# Patient Record
Sex: FEMALE | Race: WHITE | NOT HISPANIC OR LATINO | Employment: FULL TIME | URBAN - METROPOLITAN AREA
[De-identification: names, ages, dates, MRNs, and addresses within clinical notes are randomized per-mention and may not be internally consistent; named-entity substitution may affect disease eponyms.]

---

## 2024-02-23 PROBLEM — F41.9 ANXIETY: Status: ACTIVE | Noted: 2024-02-23

## 2024-02-23 PROBLEM — K50.00 CROHN'S DISEASE OF SMALL INTESTINE WITHOUT COMPLICATION: Status: ACTIVE | Noted: 2024-02-23

## 2024-02-23 PROBLEM — F32.1 CURRENT MODERATE EPISODE OF MAJOR DEPRESSIVE DISORDER: Status: ACTIVE | Noted: 2024-02-23

## 2024-02-23 PROBLEM — K90.0 CELIAC DISEASE: Status: ACTIVE | Noted: 2024-02-23

## 2024-02-23 PROBLEM — K21.9 CHRONIC GERD: Status: ACTIVE | Noted: 2024-02-23

## 2024-05-02 ENCOUNTER — APPOINTMENT (OUTPATIENT)
Dept: GENERAL RADIOLOGY | Facility: HOSPITAL | Age: 49
End: 2024-05-02
Payer: COMMERCIAL

## 2024-05-02 ENCOUNTER — HOSPITAL ENCOUNTER (EMERGENCY)
Facility: HOSPITAL | Age: 49
Discharge: HOME | End: 2024-05-02
Attending: EMERGENCY MEDICINE
Payer: COMMERCIAL

## 2024-05-02 VITALS
RESPIRATION RATE: 18 BRPM | BODY MASS INDEX: 31.6 KG/M2 | HEART RATE: 90 BPM | SYSTOLIC BLOOD PRESSURE: 118 MMHG | HEIGHT: 60 IN | OXYGEN SATURATION: 98 % | TEMPERATURE: 98.3 F | WEIGHT: 160.94 LBS | DIASTOLIC BLOOD PRESSURE: 62 MMHG

## 2024-05-02 DIAGNOSIS — S92.255A CLOSED NONDISPLACED FRACTURE OF NAVICULAR BONE OF LEFT FOOT, INITIAL ENCOUNTER: Primary | ICD-10-CM

## 2024-05-02 PROCEDURE — 73630 X-RAY EXAM OF FOOT: CPT

## 2024-05-02 PROCEDURE — 99283 EMERGENCY DEPT VISIT LOW MDM: CPT

## 2024-05-02 PROCEDURE — 73610 X-RAY EXAM OF ANKLE: CPT

## 2024-05-02 RX ORDER — OXYCODONE HYDROCHLORIDE AND ACETAMINOPHEN 5; 325 MG/1; MG/1
1 TABLET ORAL EVERY 6 HOURS PRN
Qty: 12 TABLET | Refills: 0 | Status: SHIPPED | OUTPATIENT
Start: 2024-05-02 | End: 2024-05-07 | Stop reason: SDUPTHER

## 2024-05-02 RX ORDER — TRAMADOL HYDROCHLORIDE 50 MG/1
50 TABLET ORAL ONCE
Status: DISCONTINUED | OUTPATIENT
Start: 2024-05-02 | End: 2024-05-02

## 2024-05-02 NOTE — Clinical Note
[facility] EMERGENCY ROOM  [address] [place] [address]  Phone: [phone]  Fax: [phone]    Geraldine Dobson was seen and treated in our emergency department on 5/2/2024.  She may return to work on 05/06/2024.         Thank you for choosing [facility].    Bhupinder Meyers MD

## 2024-05-02 NOTE — Clinical Note
[facility] EMERGENCY ROOM  [address] [place] [address]  Phone: [phone]  Fax: [phone]    Geraldine Dobson was seen and treated in our emergency department on 5/2/2024.  She may return to work on 05/05/2024.         Thank you for choosing [facility].    Bhupinder Meyers MD

## 2024-05-03 ENCOUNTER — TELEPHONE (OUTPATIENT)
Dept: ORTHOPEDIC SURGERY | Facility: CLINIC | Age: 49
End: 2024-05-03
Payer: COMMERCIAL

## 2024-05-03 NOTE — ED PROVIDER NOTES
"Time: 8:41 PM EDT  Date of encounter:  5/2/2024  Independent Historian/Clinical History and Information was obtained by:   Patient    History is limited by: N/A    Chief Complaint: Ankle injury      History of Present Illness:  Patient is a 48 y.o. year old female who presents to the emergency department for evaluation of left ankle and foot injury.  Patient was trying to run after her dog in the yard and did not realize there was a hole and stepped into it.  Her foot stayed in the hole in her ankle made her fall forward.  She felt like she heard a \"crunching sound\".  This happened at about 2 PM and patient has had pain since.  She is able to ambulate but it is painful.  No numbness tingling or weakness.  No previous injury.  Rates her pain currently 8 out of 10.    HPI    Patient Care Team  Primary Care Provider: Noah Razo MD    Past Medical History:     Allergies   Allergen Reactions    Other Anaphylaxis     Opioids.     Latex Hives    Morphine Nausea And Vomiting    Nsaids Unknown - High Severity     Past Medical History:   Diagnosis Date    Anxiety and depression     Celiac disease     Crohn's disease     GERD (gastroesophageal reflux disease)     IBS (irritable bowel syndrome)     PTSD (post-traumatic stress disorder)      Past Surgical History:   Procedure Laterality Date    APPENDECTOMY      CHOLECYSTECTOMY      HYSTERECTOMY      TRIGGER FINGER RELEASE Right      History reviewed. No pertinent family history.    Home Medications:  Prior to Admission medications    Medication Sig Start Date End Date Taking? Authorizing Provider   famotidine (PEPCID) 20 MG tablet Every 12 (Twelve) Hours.    ProviderPeña MD   omeprazole (priLOSEC) 20 MG capsule  2/13/24   ProviderPeña MD   PARoxetine (Paxil) 40 MG tablet Take 1 tablet by mouth Every Morning for 14 days. 1/30/24 2/13/24  Kelsea Harry MD        Social History:   Social History     Tobacco Use    Smoking status: Every Day     " "Current packs/day: 0.50     Types: Cigarettes     Passive exposure: Past    Smokeless tobacco: Never   Vaping Use    Vaping status: Never Used   Substance Use Topics    Alcohol use: Not Currently    Drug use: Never         Review of Systems:  Review of Systems   Musculoskeletal:  Positive for arthralgias (Left ankle and foot), gait problem and joint swelling (Left foot and ankle).   Skin:  Negative for color change and wound.   Neurological:  Negative for weakness and numbness.   Hematological: Negative.    Psychiatric/Behavioral: Negative.     All other systems reviewed and are negative.       Physical Exam:  /68 (BP Location: Right arm, Patient Position: Sitting)   Pulse 97   Temp 98.1 °F (36.7 °C) (Oral)   Resp 16   Ht 152.4 cm (60\")   Wt 73 kg (160 lb 15 oz)   SpO2 97%   BMI 31.43 kg/m²     Physical Exam  Vitals and nursing note reviewed.   Constitutional:       Appearance: Normal appearance.   HENT:      Head: Atraumatic.      Nose: Nose normal.      Mouth/Throat:      Mouth: Mucous membranes are moist.   Eyes:      Conjunctiva/sclera: Conjunctivae normal.   Cardiovascular:      Pulses: Normal pulses.   Pulmonary:      Effort: Pulmonary effort is normal.   Musculoskeletal:         General: Swelling (Proximal left foot and anterior and lateral left ankle) and tenderness present.      Cervical back: Normal range of motion.      Comments: Patient has full range of motion but has pain with flexion and extension as well as eversion   Skin:     General: Skin is warm and dry.      Capillary Refill: Capillary refill takes less than 2 seconds.   Neurological:      General: No focal deficit present.      Mental Status: She is alert.   Psychiatric:         Mood and Affect: Mood normal.         Behavior: Behavior normal.                Medical Decision Making:      Comorbidities that affect care:    Anxiety depression PTSD, smoking    External Notes reviewed:    Previous Clinic Note: Patient last seen by PCP " on April 4 for possible shingles      The following orders were placed and all results were independently analyzed by me:  Orders Placed This Encounter   Procedures    [facility] 08.  CAM Boot, 11.  Crutches; Yes; Yes; pain; Yes; Prevents Completion of MRADLs Within Reasonable Time Frame; Able to Safely Use Equipment; Mobility Deficit Can Be Sufficiently Resolved By Use of Equipment    XR Foot 3+ View Left    XR Ankle 3+ View Left    Ambulatory Referral to Orthopedic Surgery       Medications Given in the Emergency Department:  Medications - No data to display     ED Course:    ED Course as of 05/02/24 2136   Thu May 02, 2024   2120 XR Foot 3+ View Left  Nondisplaced navicular bone fracture [DS]   2120 XR Ankle 3+ View Left  No acute findings [DS]      ED Course User Index  [DS] Kirstin Morales APRN       Labs:    Lab Results (last 24 hours)       ** No results found for the last 24 hours. **             Imaging:    XR Foot 3+ View Left    Result Date: 5/2/2024  XR FOOT 3+ VW LEFT-  Date of Exam: 5/2/2024 9:06 PM  Indication: pain/ injury  Comparison: Left ankle series 5/2/2024  FINDINGS: There is a nondisplaced fracture of the proximal superior aspect of the navicular bone. There is adjacent soft tissue swelling.      Nondisplaced fracture of the proximal superior aspect of the navicular bone. Soft tissue swelling.   Electronically Signed By-ANDREW CASIANO MD On:5/2/2024 9:15 PM      XR Ankle 3+ View Left    Result Date: 5/2/2024  XR ANKLE 3+ VW LEFT-  Date of Exam: 5/2/2024 9:02 PM  Indication: injury  Comparison: None available.   FINDINGS: The ankle mortise is intact. No fractures, dislocations or acute bony abnormalities are identified. There are minimal calcaneal enthesophytes. Anterior soft tissue swelling is present.      Anterior soft tissue swelling. No acute osseous abnormalities are identified..                          Electronically Signed By-ANDREW CASIANO MD On:5/2/2024 9:13 PM          Differential Diagnosis and Discussion:    Extremity Pain: Differential diagnosis includes but is not limited to soft tissue sprain, tendonitis, tendon injury, dislocation, fracture, deep vein thrombosis, arterial insufficiency, osteoarthritis, bursitis, and ligamentous damage.    All X-rays impressions were independently interpreted by me.    MDM  Number of Diagnoses or Management Options  Closed nondisplaced fracture of navicular bone of left foot, initial encounter  Diagnosis management comments: The patient was placed in a walking boot splint and crutches in the emergency department. The patient was reassessed status post splinting. The patient in neurovascular intact with no numbness, tingling, or signs of compartment syndrome. The patient was counseled to follow up with the orthopedic surgeon as detailed in the discharge instructions. The patient was counseled on the signs and symptoms of compartment syndrome including worsening pain, swelling, sensory abnormalities, and color change. The patient was instructed to return to the ED sooner for re-evaluation of any of these symptoms.       Amount and/or Complexity of Data Reviewed  Tests in the radiology section of CPT®: reviewed and ordered  Tests in the medicine section of CPT®: ordered (Patient refused medication here but will take a prescription at home)    Risk of Complications, Morbidity, and/or Mortality  Presenting problems: low  Diagnostic procedures: low  Management options: low    Patient Progress  Patient progress: stable         Patient Care Considerations:    CONSULT: I considered consulting orthopedic surgery or podiatry, however x-ray is nondisplaced and patient can be placed in a boot with crutches and follow-up outpatient      Consultants/Shared Management Plan:    None    Social Determinants of Health:    Patient is independent, reliable, and has access to care.       Disposition and Care Coordination:    Discharged: The patient is  suitable and stable for discharge with no need for consideration of admission.    I have explained the patient´s condition, diagnoses and treatment plan based on the information available to me at this time. I have answered questions and addressed any concerns. The patient has a good  understanding of the patient´s diagnosis, condition, and treatment plan as can be expected at this point. The vital signs have been stable. The patient´s condition is stable and appropriate for discharge from the emergency department.      The patient will pursue further outpatient evaluation with the primary care physician or other designated or consulting physician as outlined in the discharge instructions. They are agreeable to this plan of care and follow-up instructions have been explained in detail. The patient has received these instructions in written format and has expressed an understanding of the discharge instructions. The patient is aware that any significant change in condition or worsening of symptoms should prompt an immediate return to this or the closest emergency department or call to 911.  I have explained discharge medications and the need for follow up with the patient/caretakers. This was also printed in the discharge instructions. Patient was discharged with the following medications and follow up:      Medication List        New Prescriptions      oxyCODONE-acetaminophen 5-325 MG per tablet  Commonly known as: PERCOCET  Take 1 tablet by mouth Every 6 (Six) Hours As Needed for Severe Pain.               Where to Get Your Medications        These medications were sent to [facility]/pharmacy #[id number] - [place] - [address] - [phone]  - [phone] FX  [address] [place] [address]      Hours: 24-hours Phone: [phone]   oxyCODONE-acetaminophen 5-325 MG per tablet      [facility]  [address] [facility] [address]  [phone]           Final  diagnoses:   Closed nondisplaced fracture of navicular bone of left foot, initial encounter        ED Disposition       ED Disposition   Discharge    Condition   Stable    Comment   --               This medical record created using voice recognition software.             Kirstin Morales, APRN  05/02/24 9646

## 2024-05-07 ENCOUNTER — OFFICE VISIT (OUTPATIENT)
Dept: ORTHOPEDIC SURGERY | Facility: CLINIC | Age: 49
End: 2024-05-07
Payer: COMMERCIAL

## 2024-05-07 VITALS
OXYGEN SATURATION: 95 % | BODY MASS INDEX: 31.41 KG/M2 | HEART RATE: 94 BPM | HEIGHT: 60 IN | SYSTOLIC BLOOD PRESSURE: 123 MMHG | WEIGHT: 160 LBS | DIASTOLIC BLOOD PRESSURE: 84 MMHG

## 2024-05-07 DIAGNOSIS — S92.255A CLOSED NONDISPLACED FRACTURE OF NAVICULAR BONE OF LEFT FOOT, INITIAL ENCOUNTER: ICD-10-CM

## 2024-05-07 DIAGNOSIS — M79.672 LEFT FOOT PAIN: Primary | ICD-10-CM

## 2024-05-07 DIAGNOSIS — M25.572 LEFT ANKLE PAIN, UNSPECIFIED CHRONICITY: ICD-10-CM

## 2024-05-07 PROCEDURE — 99203 OFFICE O/P NEW LOW 30 MIN: CPT | Performed by: ORTHOPAEDIC SURGERY

## 2024-05-07 PROCEDURE — 28450 TX TARSAL B1 FX W/O MNPJ EA: CPT | Performed by: ORTHOPAEDIC SURGERY

## 2024-05-07 RX ORDER — DICYCLOMINE HYDROCHLORIDE 10 MG/1
CAPSULE ORAL
COMMUNITY
Start: 2024-04-04

## 2024-05-07 RX ORDER — OMEPRAZOLE 20 MG/1
CAPSULE, DELAYED RELEASE ORAL
COMMUNITY

## 2024-05-07 RX ORDER — OXYCODONE HYDROCHLORIDE AND ACETAMINOPHEN 5; 325 MG/1; MG/1
1 TABLET ORAL EVERY 6 HOURS PRN
Qty: 12 TABLET | Refills: 0 | Status: SHIPPED | OUTPATIENT
Start: 2024-05-07

## 2024-05-07 RX ORDER — CLONIDINE HYDROCHLORIDE 0.1 MG/1
TABLET ORAL
COMMUNITY
Start: 2024-04-04

## 2024-05-07 RX ORDER — FAMOTIDINE 40 MG/1
TABLET, FILM COATED ORAL
COMMUNITY

## 2024-05-07 RX ORDER — PAROXETINE HYDROCHLORIDE 40 MG/1
1 TABLET, FILM COATED ORAL DAILY
COMMUNITY

## 2024-05-08 ENCOUNTER — PATIENT ROUNDING (BHMG ONLY) (OUTPATIENT)
Dept: ORTHOPEDIC SURGERY | Facility: CLINIC | Age: 49
End: 2024-05-08
Payer: COMMERCIAL

## 2024-05-14 ENCOUNTER — TELEPHONE (OUTPATIENT)
Dept: ORTHOPEDIC SURGERY | Facility: CLINIC | Age: 49
End: 2024-05-14
Payer: COMMERCIAL

## 2024-05-14 NOTE — TELEPHONE ENCOUNTER
PATIENT STATES HER SWELLING HAS GONE DOWN A LOT AND HER CAST IS VERY LOOSE. SHE STATES SHE CAN REACH HER HAND PRETTY FAR DOWN INTO THE CAST. SPOKE WITH AI BARRIOS WITH DR. VILLAGOMEZ, PER SOPHIE PATIENT CAN COME IN TOMORROW MORNING AT THE [facility] OFFICE TO SEE HER FOR CAST CHANGE.   PATIENT AWARE AND STATES SHE WILL COME FIRST THING.

## 2024-06-10 ENCOUNTER — OFFICE VISIT (OUTPATIENT)
Dept: ORTHOPEDIC SURGERY | Facility: CLINIC | Age: 49
End: 2024-06-10
Payer: COMMERCIAL

## 2024-06-10 VITALS — BODY MASS INDEX: 31.41 KG/M2 | HEIGHT: 60 IN | WEIGHT: 160 LBS

## 2024-06-10 DIAGNOSIS — S92.255D CLOSED NONDISPLACED FRACTURE OF NAVICULAR BONE OF LEFT FOOT WITH ROUTINE HEALING, SUBSEQUENT ENCOUNTER: ICD-10-CM

## 2024-06-10 DIAGNOSIS — M79.672 LEFT FOOT PAIN: Primary | ICD-10-CM

## 2024-06-10 DIAGNOSIS — M25.572 LEFT ANKLE PAIN, UNSPECIFIED CHRONICITY: ICD-10-CM

## 2024-06-10 PROBLEM — S92.255A CLOSED NONDISPLACED FRACTURE OF NAVICULAR BONE OF LEFT FOOT: Status: ACTIVE | Noted: 2024-06-10

## 2024-06-10 PROCEDURE — 1159F MED LIST DOCD IN RCRD: CPT | Performed by: PHYSICIAN ASSISTANT

## 2024-06-10 PROCEDURE — 1160F RVW MEDS BY RX/DR IN RCRD: CPT | Performed by: PHYSICIAN ASSISTANT

## 2024-06-10 PROCEDURE — 99213 OFFICE O/P EST LOW 20 MIN: CPT | Performed by: PHYSICIAN ASSISTANT

## 2024-06-10 NOTE — PROGRESS NOTES
"Chief Complaint  Follow-up of the Left Foot    Subjective          History of Present Illness      Geraldine Dobson is a 49 y.o. female  presents to [facility] for     Patient presents for follow-up evaluation of left navicular fracture, original injury was 5/7/2024.  She saw Dr. Jimenez he placed her into a cast cast was removed today for x-rays and physical exam after 4 weeks of casting.  Patient denies pain she states she has good range of motion already even out of the cast she denies need for pain medication or NSAIDs she states she has been walking with the cast shoe, bearing weight without pain or difficulty.  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      Allergies   Allergen Reactions    Other Anaphylaxis     Opioids.     Fentanyl Unknown - Low Severity    Latex Hives    Morphine Nausea And Vomiting    Nsaids Unknown - High Severity    Tramadol Unknown - Low Severity        Social History     Socioeconomic History    Marital status:    Tobacco Use    Smoking status: Every Day     Current packs/day: 0.50     Types: Cigarettes     Passive exposure: Past    Smokeless tobacco: Never   Vaping Use    Vaping status: Never Used   Substance and Sexual Activity    Alcohol use: Not Currently    Drug use: Never    Sexual activity: Defer        REVIEW OF SYSTEMS    Constitutional: Awake alert and oriented x3, no acute distress, denies fevers, chills, weight loss  Respiratory: No respiratory distress  Vascular: Brisk cap refill, Intact distal pulses, No cyanosis, compartments soft with no signs or symptoms of compartment syndrome or DVT.   Cardiovascular: Denies chest pain, shortness of breath  Skin: Denies rashes, acute skin changes  Neurologic: Denies headache, loss of consciousness  MSK: Left foot pain      Objective   Vital Signs:   Ht 152.4 cm (60\")   Wt 72.6 kg (160 lb)   BMI 31.25 kg/m²   "   Body mass index is 31.25 kg/m².    Physical Exam       Left foot: Skin is intact, mild dryness, no skin irritation or full-thickness skin loss, patient able to wiggle toes, sensation intact to light touch, 2+ dorsalis pedis/posterior tibialis pulses, capillary refill less than 3 seconds, nontender calf, negative Itzel testing      Procedures    Imaging Results (Most Recent)       Procedure Component Value Units Date/Time    XR Foot 2 View Left [[id number]] Resulted: 06/10/24 1516     Updated: 06/10/24 1517    Narrative:      View:AP/Lateral view(s)  Site: Left foot  Indication: Left foot pain  Study: X-rays ordered, taken in the office, and reviewed today  X-ray: Good healing of navicular fracture fracture alignment remains   stable compared to previous studies  Comparative data: Previous studies             Result Review :   The following data was reviewed by: RAYMOND Jimenez on 06/10/2024:  Data reviewed : Radiologic studies reviewed by me with the patient              Assessment and Plan    Diagnoses and all orders for this visit:    1. Left foot pain (Primary)  -     XR Foot 2 View Left    2. Left ankle pain, unspecified chronicity    3. Closed nondisplaced fracture of navicular bone of left foot with routine healing, subsequent encounter        Reviewed x-rays with the patient discussed diagnosis and treatment options with her she was advised that she can remain out of the cast she was placed into a short boot, weight-bear as tolerated in the boot she would like to return to work 8-hour shifts she was given a note for this, if any new or concerning symptoms occur call right away otherwise she was advised to do home exercises and follow-up in 4 weeks for recheck with x-rays.    Call or return if worsening symptoms.    Follow Up   Return in about 4 weeks (around 7/8/2024) for Recheck.  Patient was given instructions and counseling regarding her condition or for health maintenance advice. Please see  specific information pulled into the AVS if appropriate.       EMR Dragon/Transcription disclaimer:  Part of this note may be an electronic transcription/translation of spoken language to printed text using the Dragon Dictation System

## 2024-06-26 ENCOUNTER — TELEPHONE (OUTPATIENT)
Dept: ORTHOPEDIC SURGERY | Facility: CLINIC | Age: 49
End: 2024-06-26
Payer: COMMERCIAL

## 2024-06-26 NOTE — TELEPHONE ENCOUNTER
Caller: Geraldine Dobson     Relationship: SELF     Best call back number: [phone]    What is your medical concern? PAIN, AND SWOLLEN - LEFT ANKLE . SHARP PAIN ON BOTH SIDE OF LEFT ANKLE.PT STATES THAT THERE IS PAIN IN THE LEFT HEEL -  PT STATES THAT SHE MAY HAVE RE-INJURED HER ANKLE AND WHEN SHE STEPS DOWN IT FEELS LIKE HER BONE IS COMING OUT OF HER HEEL.     PT STATES THAT SHE BROKE HER ANKLE AND IS CONCERNED ABOUT THIS NEW PAIN.    PLEASE CONTACT PT AND ADVISE    TRIED TO WT- NO ANSWER    How long has this issue been going on? YESTERDAY      Have you been treated for this issue? LAST VISIT FOR HER ANKLE WAS 6/10/24

## 2024-07-15 ENCOUNTER — OFFICE VISIT (OUTPATIENT)
Dept: ORTHOPEDIC SURGERY | Facility: CLINIC | Age: 49
End: 2024-07-15
Payer: COMMERCIAL

## 2024-07-15 VITALS
DIASTOLIC BLOOD PRESSURE: 74 MMHG | OXYGEN SATURATION: 96 % | WEIGHT: 160 LBS | HEART RATE: 87 BPM | SYSTOLIC BLOOD PRESSURE: 135 MMHG | BODY MASS INDEX: 31.41 KG/M2 | HEIGHT: 60 IN

## 2024-07-15 DIAGNOSIS — S92.255D CLOSED NONDISPLACED FRACTURE OF NAVICULAR BONE OF LEFT FOOT WITH ROUTINE HEALING, SUBSEQUENT ENCOUNTER: ICD-10-CM

## 2024-07-15 DIAGNOSIS — M25.572 LEFT ANKLE PAIN, UNSPECIFIED CHRONICITY: ICD-10-CM

## 2024-07-15 DIAGNOSIS — M79.672 LEFT FOOT PAIN: Primary | ICD-10-CM

## 2024-07-15 PROCEDURE — 1159F MED LIST DOCD IN RCRD: CPT | Performed by: PHYSICIAN ASSISTANT

## 2024-07-15 PROCEDURE — 99024 POSTOP FOLLOW-UP VISIT: CPT | Performed by: PHYSICIAN ASSISTANT

## 2024-07-15 PROCEDURE — 1160F RVW MEDS BY RX/DR IN RCRD: CPT | Performed by: PHYSICIAN ASSISTANT

## 2024-07-15 NOTE — PROGRESS NOTES
Chief Complaint  Follow-up of the Left Foot    Subjective          History of Present Illness      Geraldine Dobson is a 49 y.o. female  presents to [facility] for     Patient presents for follow-up evaluation of left navicular fracture, original injury was 5/7/2024.  Patient was in a cast cast was removed at last visit she was placed into a short boot.  Patient states she has been ambulating well in the short boot she denies pain, denies swelling, denies difficulty with activities.  She has been back to work as light duty with 8-hour shifts instead of 12-hour shifts.  She states she tolerated those well.  She states she has no pain she has weightbearing at her home without any difficulty or pain or trouble.  No new complaints today.  She denies need for pain medication or NSAIDs.      Allergies   Allergen Reactions    Other Anaphylaxis     Opioids.     Fentanyl Unknown - Low Severity    Latex Hives    Morphine Nausea And Vomiting    Nsaids Unknown - High Severity    Tramadol Unknown - Low Severity        Social History     Socioeconomic History    Marital status:    Tobacco Use    Smoking status: Every Day     Current packs/day: 0.50     Types: Cigarettes     Passive exposure: Past    Smokeless tobacco: Never   Vaping Use    Vaping status: Never Used   Substance and Sexual Activity    Alcohol use: Not Currently    Drug use: Never    Sexual activity: Defer        REVIEW OF SYSTEMS    Constitutional: Awake alert and oriented x3, no acute distress, denies fevers, chills, weight loss  Respiratory: No respiratory distress  Vascular: Brisk cap refill, Intact distal pulses, No cyanosis, compartments soft with no signs or symptoms of compartment syndrome or DVT.   Cardiovascular: Denies chest pain, shortness of breath  Skin: Denies rashes, acute skin changes  Neurologic: Denies headache, loss of consciousness  MSK: Left ankle and foot pain      Objective   Vital Signs:   /74    "Pulse 87   Ht 152.4 cm (60\")   Wt 72.6 kg (160 lb)   SpO2 96%   BMI 31.25 kg/m²     Body mass index is 31.25 kg/m².    Physical Exam       Left ankle/foot: Nontender to palpation in area of fracture site, skin is intact, no erythema ecchymosis swelling or signs of infection, patient able to wiggle toes dorsiflexion 10 plantarflexion 40, stable, no pain with resisted range of motion 5 out of 5 strength, patient ambulates with nonantalgic gait      Procedures    Imaging Results (Most Recent)       Procedure Component Value Units Date/Time    XR Foot 2 View Left [[id number]] Resulted: 07/15/24 1348     Updated: 07/15/24 1348    Narrative:      View:AP/Lateral view(s)  Site: Left foot  Indication: Left foot pain  Study: X-rays ordered, taken in the office, and reviewed today  X-ray: Well-healed navicular fracture fracture alignment remains stable   compared to previous studies, no increased displacement or angulation  Comparative data: Previous studies             Result Review :   The following data was reviewed by: RAYMOND Jimenez on 07/15/2024:  Data reviewed : Radiologic studies reviewed by me with the patient              Assessment and Plan    Diagnoses and all orders for this visit:    1. Left foot pain (Primary)    2. Left ankle pain, unspecified chronicity    3. Closed nondisplaced fracture of navicular bone of left foot with routine healing, subsequent encounter  -     XR Foot 2 View Left        Reviewed x-rays with the patient discussed diagnosis and treatment options with her she was advised to continue activity and weightbearing as tolerated she was given return to work full duty no restrictions follow-up as needed.  Patient agreed    Call or return if worsening symptoms.    Follow Up   Return if symptoms worsen or fail to improve.  Patient was given instructions and counseling regarding her condition or for health maintenance advice. Please see specific information pulled into the AVS if " appropriate.       EMR Dragon/Transcription disclaimer:  Part of this note may be an electronic transcription/translation of spoken language to printed text using the Dragon Dictation System

## 2024-08-30 ENCOUNTER — TELEPHONE (OUTPATIENT)
Dept: ORTHOPEDIC SURGERY | Facility: CLINIC | Age: 49
End: 2024-08-30
Payer: COMMERCIAL

## 2024-08-30 NOTE — TELEPHONE ENCOUNTER
ATTEMPTED TO WARM TRANSFER    Caller: DAVID MINAYA    Relationship to patient: SELF    Best call back number: [phone]    Chief complaint:  PATIENT THINKS SHE BROKE HER LEFT FOOT/ANKLE AGAIN LAST NIGHT. STEPPED WRONG IN A PARKING LOT. SAYS IS SWOLLEN, PAINFUL AND SHE CAN HARDLY BEAR WEIGHT. PATIENT HAS NOT GONE ANYWHERE FOR TREATMENT. NO APPT.'S AVAILABLE. PLEASE ADVISE.    Type of visit: NEW PROBLEM

## 2025-03-19 ENCOUNTER — HOSPITAL ENCOUNTER (EMERGENCY)
Facility: HOSPITAL | Age: 50
Discharge: HOME | End: 2025-03-19
Attending: EMERGENCY MEDICINE
Payer: COMMERCIAL

## 2025-03-19 VITALS
TEMPERATURE: 98.2 F | SYSTOLIC BLOOD PRESSURE: 127 MMHG | DIASTOLIC BLOOD PRESSURE: 69 MMHG | HEIGHT: 60 IN | RESPIRATION RATE: 15 BRPM | WEIGHT: 173.28 LBS | HEART RATE: 70 BPM | BODY MASS INDEX: 34.02 KG/M2 | OXYGEN SATURATION: 98 %

## 2025-03-19 DIAGNOSIS — F41.1 ANXIETY IN ACUTE STRESS REACTION: Primary | ICD-10-CM

## 2025-03-19 DIAGNOSIS — F43.0 ANXIETY IN ACUTE STRESS REACTION: Primary | ICD-10-CM

## 2025-03-19 DIAGNOSIS — R11.2 NAUSEA AND VOMITING, UNSPECIFIED VOMITING TYPE: ICD-10-CM

## 2025-03-19 DIAGNOSIS — R10.13 ABDOMINAL PAIN, ACUTE, EPIGASTRIC: ICD-10-CM

## 2025-03-19 LAB
ALBUMIN SERPL-MCNC: 3.9 G/DL (ref 3.5–5.2)
ALBUMIN/GLOB SERPL: 1.3 G/DL
ALP SERPL-CCNC: 104 U/L (ref 39–117)
ALT SERPL W P-5'-P-CCNC: 13 U/L (ref 1–33)
ANION GAP SERPL CALCULATED.3IONS-SCNC: 8.5 MMOL/L (ref 5–15)
AST SERPL-CCNC: 15 U/L (ref 1–32)
BASOPHILS # BLD AUTO: 0.09 10*3/MM3 (ref 0–0.2)
BASOPHILS NFR BLD AUTO: 0.8 % (ref 0–1.5)
BILIRUB SERPL-MCNC: <0.2 MG/DL (ref 0–1.2)
BUN SERPL-MCNC: 7 MG/DL (ref 6–20)
BUN/CREAT SERPL: 7.6 (ref 7–25)
CALCIUM SPEC-SCNC: 9.2 MG/DL (ref 8.6–10.5)
CHLORIDE SERPL-SCNC: 107 MMOL/L (ref 98–107)
CO2 SERPL-SCNC: 24.5 MMOL/L (ref 22–29)
CREAT SERPL-MCNC: 0.92 MG/DL (ref 0.57–1)
DEPRECATED RDW RBC AUTO: 43 FL (ref 37–54)
EGFRCR SERPLBLD CKD-EPI 2021: 77 ML/MIN/1.73
EOSINOPHIL # BLD AUTO: 0.18 10*3/MM3 (ref 0–0.4)
EOSINOPHIL NFR BLD AUTO: 1.5 % (ref 0.3–6.2)
ERYTHROCYTE [DISTWIDTH] IN BLOOD BY AUTOMATED COUNT: 12.9 % (ref 12.3–15.4)
GLOBULIN UR ELPH-MCNC: 3 GM/DL
GLUCOSE SERPL-MCNC: 103 MG/DL (ref 65–99)
HCT VFR BLD AUTO: 44.4 % (ref 34–46.6)
HGB BLD-MCNC: 14.8 G/DL (ref 12–15.9)
HOLD SPECIMEN: NORMAL
IMM GRANULOCYTES # BLD AUTO: 0.03 10*3/MM3 (ref 0–0.05)
IMM GRANULOCYTES NFR BLD AUTO: 0.3 % (ref 0–0.5)
LIPASE SERPL-CCNC: 22 U/L (ref 13–60)
LYMPHOCYTES # BLD AUTO: 1.85 10*3/MM3 (ref 0.7–3.1)
LYMPHOCYTES NFR BLD AUTO: 15.7 % (ref 19.6–45.3)
MCH RBC QN AUTO: 30 PG (ref 26.6–33)
MCHC RBC AUTO-ENTMCNC: 33.3 G/DL (ref 31.5–35.7)
MCV RBC AUTO: 90.1 FL (ref 79–97)
MONOCYTES # BLD AUTO: 0.8 10*3/MM3 (ref 0.1–0.9)
MONOCYTES NFR BLD AUTO: 6.8 % (ref 5–12)
NEUTROPHILS NFR BLD AUTO: 74.9 % (ref 42.7–76)
NEUTROPHILS NFR BLD AUTO: 8.82 10*3/MM3 (ref 1.7–7)
NRBC BLD AUTO-RTO: 0 /100 WBC (ref 0–0.2)
PLATELET # BLD AUTO: 325 10*3/MM3 (ref 140–450)
PMV BLD AUTO: 9.6 FL (ref 6–12)
POTASSIUM SERPL-SCNC: 4.2 MMOL/L (ref 3.5–5.2)
PROT SERPL-MCNC: 6.9 G/DL (ref 6–8.5)
RBC # BLD AUTO: 4.93 10*6/MM3 (ref 3.77–5.28)
SODIUM SERPL-SCNC: 140 MMOL/L (ref 136–145)
WBC NRBC COR # BLD AUTO: 11.77 10*3/MM3 (ref 3.4–10.8)
WHOLE BLOOD HOLD COAG: NORMAL

## 2025-03-19 PROCEDURE — 25810000003 SODIUM CHLORIDE 0.9 % SOLUTION: Performed by: EMERGENCY MEDICINE

## 2025-03-19 PROCEDURE — 80053 COMPREHEN METABOLIC PANEL: CPT | Performed by: EMERGENCY MEDICINE

## 2025-03-19 PROCEDURE — 96374 THER/PROPH/DIAG INJ IV PUSH: CPT

## 2025-03-19 PROCEDURE — 25010000002 LORAZEPAM PER 2 MG: Performed by: EMERGENCY MEDICINE

## 2025-03-19 PROCEDURE — 83690 ASSAY OF LIPASE: CPT | Performed by: EMERGENCY MEDICINE

## 2025-03-19 PROCEDURE — 93005 ELECTROCARDIOGRAM TRACING: CPT | Performed by: EMERGENCY MEDICINE

## 2025-03-19 PROCEDURE — 99283 EMERGENCY DEPT VISIT LOW MDM: CPT

## 2025-03-19 PROCEDURE — 36415 COLL VENOUS BLD VENIPUNCTURE: CPT | Performed by: EMERGENCY MEDICINE

## 2025-03-19 PROCEDURE — 96375 TX/PRO/DX INJ NEW DRUG ADDON: CPT

## 2025-03-19 PROCEDURE — 85025 COMPLETE CBC W/AUTO DIFF WBC: CPT | Performed by: EMERGENCY MEDICINE

## 2025-03-19 PROCEDURE — 93010 ELECTROCARDIOGRAM REPORT: CPT | Performed by: SPECIALIST

## 2025-03-19 PROCEDURE — 25010000002 METOCLOPRAMIDE PER 10 MG: Performed by: EMERGENCY MEDICINE

## 2025-03-19 RX ORDER — LORAZEPAM 2 MG/ML
1 INJECTION INTRAMUSCULAR ONCE
Status: COMPLETED | OUTPATIENT
Start: 2025-03-19 | End: 2025-03-19

## 2025-03-19 RX ORDER — FAMOTIDINE 10 MG/ML
20 INJECTION, SOLUTION INTRAVENOUS ONCE
Status: COMPLETED | OUTPATIENT
Start: 2025-03-19 | End: 2025-03-19

## 2025-03-19 RX ORDER — PROMETHAZINE HYDROCHLORIDE 25 MG/1
25 TABLET ORAL EVERY 6 HOURS PRN
Qty: 15 TABLET | Refills: 0 | Status: SHIPPED | OUTPATIENT
Start: 2025-03-19

## 2025-03-19 RX ORDER — LORAZEPAM 1 MG/1
1 TABLET ORAL EVERY 8 HOURS PRN
Qty: 10 TABLET | Refills: 0 | Status: SHIPPED | OUTPATIENT
Start: 2025-03-19

## 2025-03-19 RX ORDER — METOCLOPRAMIDE HYDROCHLORIDE 5 MG/ML
10 INJECTION INTRAMUSCULAR; INTRAVENOUS ONCE
Status: COMPLETED | OUTPATIENT
Start: 2025-03-19 | End: 2025-03-19

## 2025-03-19 RX ORDER — SODIUM CHLORIDE 0.9 % (FLUSH) 0.9 %
10 SYRINGE (ML) INJECTION AS NEEDED
Status: DISCONTINUED | OUTPATIENT
Start: 2025-03-19 | End: 2025-03-19 | Stop reason: HOSPADM

## 2025-03-19 RX ORDER — PAROXETINE 40 MG/1
40 TABLET, FILM COATED ORAL DAILY
Qty: 30 TABLET | Refills: 1 | Status: SHIPPED | OUTPATIENT
Start: 2025-03-19

## 2025-03-19 RX ADMIN — FAMOTIDINE 20 MG: 10 INJECTION INTRAVENOUS at 14:25

## 2025-03-19 RX ADMIN — METOCLOPRAMIDE 10 MG: 5 INJECTION, SOLUTION INTRAMUSCULAR; INTRAVENOUS at 14:24

## 2025-03-19 RX ADMIN — SODIUM CHLORIDE 1000 ML: 0.9 INJECTION, SOLUTION INTRAVENOUS at 14:24

## 2025-03-19 RX ADMIN — LORAZEPAM 1 MG: 2 INJECTION INTRAMUSCULAR; INTRAVENOUS at 14:25

## 2025-03-19 NOTE — DISCHARGE INSTRUCTIONS
Your lab work looked okay today and probably a lot of your symptoms were due to stress levels and anxiety, causing your vomiting and now you are having some acid reflux in your stomach.    Take anxiety medicine as needed and alternate with Phenergan for nausea and vomiting.    You can restart your Paxil.

## 2025-03-19 NOTE — ED PROVIDER NOTES
Time: 2:11 PM EDT  Date of encounter:  3/19/2025  Independent Historian/Clinical History and Information was obtained by:   Patient and Nursing Staff    History is limited by: N/A    Chief Complaint: Anxiety, nausea and vomiting, upper abdominal pain      History of Present Illness:  Patient is a 49 y.o. year old female with history of GERD and Crohn's disease and celiac disease, anxiety and PTSD who presents to the emergency department for evaluation of acute nausea and vomiting and upper abdominal pain, but also severe anxiety and panic attack earlier in the setting of her father recently passed away and she witnessed him and tried performing bystander CPR on him the other day.    It sounds like she has had several family members die that were close to her in the past couple years.    She has been vomiting frequently and not coping well with the high stress levels from this loss of her loved one.    Denies any known sick contacts or new medications that could be causing all this vomiting..      Patient Care Team  Primary Care Provider: Noah Razo MD    Past Medical History:     Allergies   Allergen Reactions    Other Anaphylaxis     Opioids.     Fentanyl Unknown - Low Severity    Latex Hives    Morphine Nausea And Vomiting    Nsaids Unknown - High Severity    Tramadol Unknown - Low Severity     Past Medical History:   Diagnosis Date    Anxiety and depression     Celiac disease     Crohn's disease     GERD (gastroesophageal reflux disease)     IBS (irritable bowel syndrome)     PTSD (post-traumatic stress disorder)      Past Surgical History:   Procedure Laterality Date    APPENDECTOMY      CHOLECYSTECTOMY      HYSTERECTOMY      TRIGGER FINGER RELEASE Right      History reviewed. No pertinent family history.    Home Medications:  Prior to Admission medications    Medication Sig Start Date End Date Taking? Authorizing Provider   azithromycin (Zithromax Z-David) 250 MG tablet Take 2 tablets by mouth on day 1,  "then 1 tablet daily on days 2-5 10/23/24   Erick Lorenzo,    dicyclomine (BENTYL) 10 MG capsule  4/4/24   Peña Suazo MD   famotidine (Pepcid) 40 MG tablet     Peña Suazo MD   omeprazole (priLOSEC) 20 MG capsule     Peña Suazo MD   PARoxetine (Paxil) 40 MG tablet Take 1 tablet by mouth Daily.    Peña Suazo MD   predniSONE (DELTASONE) 20 MG tablet Take 1 tablet twice a day with meals. 10/18/24   Alexsandra Stearns MD   promethazine-dextromethorphan (PROMETHAZINE-DM) 6.25-15 MG/5ML syrup Take 1 teaspoon each 4 to 6 hours as needed for cough.  Be aware that the medication can make you drowsy. 10/18/24   Alexsandra Stearns MD        Social History:   Social History     Tobacco Use    Smoking status: Former     Current packs/day: 0.50     Types: Cigarettes     Passive exposure: Past    Smokeless tobacco: Former   Vaping Use    Vaping status: Never Used   Substance Use Topics    Alcohol use: Not Currently    Drug use: Never         Review of Systems:  Review of Systems   I performed a 10 point review of systems which was all negative, except for the positives found in the HPI above.  Physical Exam:  /81   Pulse 73   Temp 98.2 °F (36.8 °C) (Oral)   Resp 20   Ht 152.4 cm (60\")   Wt 78.6 kg (173 lb 4.5 oz)   SpO2 92%   BMI 33.84 kg/m²     Physical Exam   General: Awake alert and in moderate distress, also appears anxious    HEENT: Head normocephalic atraumatic, eyes PERRLA EOMI, nose normal, oropharynx normal.  Mucous membranes mildly dry    Neck: Supple full range of motion, no meningismus, no lymphadenopathy    Heart: Regular rate and rhythm, no murmurs or rubs, 2+ radial pulses bilaterally    Lungs: Clear to auscultation bilaterally without wheezes or crackles, no respiratory distress    Abdomen: Soft, mildly tender in the epigastrium, nondistended, no rebound or guarding    Skin: Warm, dry, no rash    Musculoskeletal: Normal range of motion, no lower " extremity edema    Neurologic: Oriented x3, no motor deficits no sensory deficits    Psychiatric: Mood appears significantly anxious, no psychosis            Medical Decision Making:      Comorbidities that affect care:    GERD, celiac disease, anxiety and PTSD    External Notes reviewed:    None      The following orders were placed and all results were independently analyzed by me:  Orders Placed This Encounter   Procedures    Comprehensive Metabolic Panel    Lipase    CBC Auto Differential    ECG 12 Lead Tachycardia    Insert Peripheral IV    CBC & Differential    Extra Tubes    Gold Top - SST    Light Blue Top       Medications Given in the Emergency Department:  Medications   sodium chloride 0.9 % flush 10 mL (has no administration in time range)   sodium chloride 0.9 % bolus 1,000 mL (1,000 mL Intravenous New Bag 3/19/25 1424)   famotidine (PEPCID) injection 20 mg (20 mg Intravenous Given 3/19/25 1425)   metoclopramide (REGLAN) injection 10 mg (10 mg Intravenous Given 3/19/25 1424)   LORazepam (ATIVAN) injection 1 mg (1 mg Intravenous Given 3/19/25 1425)        ED Course:    ED Course as of 03/19/25 1529   Wed Mar 19, 2025   1434 EKG: I interpreted her twelve-lead EKG as normal sinus rhythm at 73 beats a minute, normal P waves, normal QRS, normal ST segments some T wave inversions in the septal leads only.  No acute ischemia or ectopy noted. [VS]      ED Course User Index  [VS] Alber Freeman MD       Labs:    Lab Results (last 24 hours)       Procedure Component Value Units Date/Time    CBC & Differential [[id number]]  (Abnormal) Collected: 03/19/25 1433    Specimen: Blood Updated: 03/19/25 1437    Narrative:      The following orders were created for panel order CBC & Differential.  Procedure                               Abnormality         Status                     ---------                               -----------         ------                     CBC Auto Differential[[id number]]        Abnormal             Final result                 Please view results for these tests on the individual orders.    CBC Auto Differential [[id number]]  (Abnormal) Collected: 03/19/25 1433    Specimen: Blood Updated: 03/19/25 1437     WBC 11.77 10*3/mm3      RBC 4.93 10*6/mm3      Hemoglobin 14.8 g/dL      Hematocrit 44.4 %      MCV 90.1 fL      MCH 30.0 pg      MCHC 33.3 g/dL      RDW 12.9 %      RDW-SD 43.0 fl      MPV 9.6 fL      Platelets 325 10*3/mm3      Neutrophil % 74.9 %      Lymphocyte % 15.7 %      Monocyte % 6.8 %      Eosinophil % 1.5 %      Basophil % 0.8 %      Immature Grans % 0.3 %      Neutrophils, Absolute 8.82 10*3/mm3      Lymphocytes, Absolute 1.85 10*3/mm3      Monocytes, Absolute 0.80 10*3/mm3      Eosinophils, Absolute 0.18 10*3/mm3      Basophils, Absolute 0.09 10*3/mm3      Immature Grans, Absolute 0.03 10*3/mm3      nRBC 0.0 /100 WBC     Comprehensive Metabolic Panel [[id number]]  (Abnormal) Collected: 03/19/25 1457    Specimen: Blood from Arm, Right Updated: 03/19/25 1521     Glucose 103 mg/dL      BUN 7 mg/dL      Creatinine 0.92 mg/dL      Sodium 140 mmol/L      Potassium 4.2 mmol/L      Chloride 107 mmol/L      CO2 24.5 mmol/L      Calcium 9.2 mg/dL      Total Protein 6.9 g/dL      Albumin 3.9 g/dL      ALT (SGPT) 13 U/L      AST (SGOT) 15 U/L      Alkaline Phosphatase 104 U/L      Total Bilirubin <0.2 mg/dL      Globulin 3.0 gm/dL      A/G Ratio 1.3 g/dL      BUN/Creatinine Ratio 7.6     Anion Gap 8.5 mmol/L      eGFR 77.0 mL/min/1.73     Narrative:      GFR Categories in Chronic Kidney Disease (CKD)      GFR Category          GFR (mL/min/1.73)    Interpretation  G1                     90 or greater         Normal or high (1)  G2                      60-89                Mild decrease (1)  G3a                   45-59                Mild to moderate decrease  G3b                   30-44                Moderate to severe decrease  G4                    15-29                Severe decrease  G5                     14 or less           Kidney failure          (1)In the absence of evidence of kidney disease, neither GFR category G1 or G2 fulfill the criteria for CKD.    eGFR calculation 2021 CKD-EPI creatinine equation, which does not include race as a factor    Lipase [[id number]]  (Normal) Collected: 03/19/25 1457    Specimen: Blood from Arm, Right Updated: 03/19/25 1521     Lipase 22 U/L              Imaging:    No Radiology Exams Resulted Within Past 24 Hours      Differential Diagnosis and Discussion:    Abdominal Pain: Based on the patient's signs and symptoms, I considered abdominal aortic aneurysm, small bowel obstruction, pancreatitis, acute cholecystitis, acute appendecitis, peptic ulcer disease, gastritis, colitis, endocrine disorders, irritable bowel syndrome and other differential diagnosis an etiology of the patient's abdominal pain.  Psychiatric: Differential diagnosis includes but is not limited to depression, psychosis, bipolar disorder, anxiety, manic episode, schizophrenia, and substance abuse.  Vomiting: Differential diagnosis includes but is not limited to migraine, labyrinthine disorders, psychogenic, metabolic and endocrine causes, peptic ulcer, gastric outlet obstruction, gastritis, gastroenteritis, appendicitis, intestinal obstruction, paralytic ileus, food poisoning, cholecystitis, acute hepatitis, acute pancreatitis, acute febrile illness, and myocardial infarction.    PROCEDURES:    Labs were collected in the emergency department and all labs were reviewed and interpreted by me.    ECG 12 Lead Tachycardia   Preliminary Result   HEART RATE=73  bpm   RR Ijqtjrld=475  ms   NC Ipgslyep=451  ms   P Horizontal Axis=11  deg   P Front Axis=22  deg   QRSD Interval=79  ms   QT Irkvtobh=166  ms   MPrS=921  ms   QRS Axis=60  deg   T Wave Axis=36  deg   - OTHERWISE NORMAL ECG -   Sinus rhythm   Borderline low voltage, extremity leads   Date and Time of Study:2025-03-19 14:17:14          Procedures    MDM      Amount and/or Complexity of Data Reviewed  Clinical lab tests: reviewed  Tests in the medicine section of CPT®: reviewed           This patient is a 49-year-old female presenting with severe anxiety and increased stress levels as well as nausea and vomiting and upper abdominal pain in the setting of her father recently passing away.    I am giving her a dose of IV Ativan for her anxiety attack, and also some IV fluids and nausea medicine and Pepcid for her recent vomiting and burning epigastric pain.    I will check screening lab work.      I reviewed her lab work and it looks like she has a mildly elevated white blood cell count of 11.7 in the setting of acute stress and anxiety and vomiting, when I consider this mostly acute stress reactant as opposed to bacterial infection.    Her hemoglobin was 14.8 which is reassuring and all of her kidney function or liver enzymes look normal.  Lipase normal.    I think she can be safely discharged home.    I think most of her symptoms are due to anxiety and stress causing the vomiting and I will prescribe her a few days of anxiolytic medication in addition to some Phenergan for nausea and vomiting.  I will also refill her Paxil 40 mg daily.                  Patient Care Considerations:          Consultants/Shared Management Plan:        Social Determinants of Health:    Patient is independent, reliable, and has access to care.       Disposition and Care Coordination:    Discharged: The patient is suitable and stable for discharge with no need for consideration of admission.    I have explained the patient´s condition, diagnoses and treatment plan based on the information available to me at this time. I have answered questions and addressed any concerns. The patient has a good  understanding of the patient´s diagnosis, condition, and treatment plan as can be expected at this point. The vital signs have been stable. The patient´s condition is stable and appropriate for  discharge from the emergency department.      The patient will pursue further outpatient evaluation with the primary care physician or other designated or consulting physician as outlined in the discharge instructions. They are agreeable to this plan of care and follow-up instructions have been explained in detail. The patient has received these instructions in written format and has expressed an understanding of the discharge instructions. The patient is aware that any significant change in condition or worsening of symptoms should prompt an immediate return to this or the closest emergency department or call to 1.  I have explained discharge medications and the need for follow up with the patient/caretakers. This was also printed in the discharge instructions. Patient was discharged with the following medications and follow up:      Medication List        New Prescriptions      LORazepam 1 MG tablet  Commonly known as: ATIVAN  Take 1 tablet by mouth Every 8 (Eight) Hours As Needed for Anxiety.     promethazine 25 MG tablet  Commonly known as: PHENERGAN  Take 1 tablet by mouth Every 6 (Six) Hours As Needed for Nausea or Vomiting.               Where to Get Your Medications        These medications were sent to [facility] [address] [place] [address] [phone] [facility] [phone] [place] [id number]      Phone: [phone]   LORazepam 1 MG tablet  PARoxetine 40 MG tablet  promethazine 25 MG tablet      Noah Razo MD  [address] [place] [id number]  [phone]    Call in 2 days  As needed, If symptoms worsen, for a follow-up appointment       Final diagnoses:   Anxiety in acute stress reaction   Nausea and vomiting, unspecified vomiting type   Abdominal pain, acute, epigastric        ED Disposition       ED Disposition   Discharge    Condition   Stable    Comment   --               This medical record created using voice recognition  software.             Alber Freeman MD  03/19/25 6581

## 2025-03-19 NOTE — Clinical Note
[facility] EMERGENCY ROOM  [address] [place] [address]  Phone: [phone]  Fax: [phone]    Geraldine Dobson was seen and treated in our emergency department on 3/19/2025.  She may return to work on 03/22/2025.         Thank you for choosing [facility].    Alber Freeman MD

## 2025-03-20 LAB
QT INTERVAL: 382 MS
QTC INTERVAL: 422 MS

## 2025-04-09 PROCEDURE — 82948 REAGENT STRIP/BLOOD GLUCOSE: CPT

## 2025-05-05 ENCOUNTER — HOSPITAL ENCOUNTER (EMERGENCY)
Facility: HOSPITAL | Age: 50
Discharge: HOME | End: 2025-05-05
Attending: EMERGENCY MEDICINE | Admitting: EMERGENCY MEDICINE
Payer: COMMERCIAL

## 2025-05-05 VITALS
BODY MASS INDEX: 33.54 KG/M2 | DIASTOLIC BLOOD PRESSURE: 88 MMHG | HEART RATE: 98 BPM | RESPIRATION RATE: 22 BRPM | HEIGHT: 60 IN | OXYGEN SATURATION: 98 % | SYSTOLIC BLOOD PRESSURE: 134 MMHG | TEMPERATURE: 98.5 F | WEIGHT: 170.86 LBS

## 2025-05-05 DIAGNOSIS — F41.0 PANIC ATTACK: ICD-10-CM

## 2025-05-05 DIAGNOSIS — F43.0 ACUTE STRESS REACTION: Primary | ICD-10-CM

## 2025-05-05 PROCEDURE — 63710000001 PROMETHAZINE PER 25 MG: Performed by: EMERGENCY MEDICINE

## 2025-05-05 PROCEDURE — 99283 EMERGENCY DEPT VISIT LOW MDM: CPT

## 2025-05-05 RX ORDER — PAROXETINE 40 MG/1
40 TABLET, FILM COATED ORAL DAILY
Qty: 30 TABLET | Refills: 0 | Status: SHIPPED | OUTPATIENT
Start: 2025-05-05

## 2025-05-05 RX ORDER — PROMETHAZINE HYDROCHLORIDE 25 MG/1
25 TABLET ORAL ONCE
Status: COMPLETED | OUTPATIENT
Start: 2025-05-05 | End: 2025-05-05

## 2025-05-05 RX ORDER — DIAZEPAM 5 MG/1
5 TABLET ORAL EVERY 8 HOURS PRN
Qty: 12 TABLET | Refills: 0 | Status: SHIPPED | OUTPATIENT
Start: 2025-05-05

## 2025-05-05 RX ORDER — PROMETHAZINE HYDROCHLORIDE 25 MG/1
25 TABLET ORAL EVERY 6 HOURS PRN
Qty: 15 TABLET | Refills: 0 | Status: SHIPPED | OUTPATIENT
Start: 2025-05-05

## 2025-05-05 RX ORDER — DIAZEPAM 10 MG/2ML
5 INJECTION, SOLUTION INTRAMUSCULAR; INTRAVENOUS ONCE
Status: DISCONTINUED | OUTPATIENT
Start: 2025-05-05 | End: 2025-05-05

## 2025-05-05 RX ORDER — DIAZEPAM 5 MG/1
5 TABLET ORAL ONCE
Status: COMPLETED | OUTPATIENT
Start: 2025-05-05 | End: 2025-05-05

## 2025-05-05 RX ADMIN — DIAZEPAM 5 MG: 5 TABLET ORAL at 12:25

## 2025-05-05 RX ADMIN — PROMETHAZINE HYDROCHLORIDE 25 MG: 25 TABLET ORAL at 12:25

## 2025-05-05 NOTE — ED PROVIDER NOTES
Time: 4:45 PM EDT  Date of encounter:  5/5/2025  Independent Historian/Clinical History and Information was obtained by:   Patient and Family    History is limited by: N/A    Chief Complaint: Anxiety      History of Present Illness:  Patient is a 49 y.o. year old female who presents to the emergency department for evaluation of panic attack.      Patient Care Team  Primary Care Provider: Noah Razo MD    Past Medical History:     Allergies   Allergen Reactions    Other Anaphylaxis     Opioids.     Fentanyl Unknown - Low Severity    Latex Hives    Morphine Nausea And Vomiting    Nsaids Unknown - High Severity    Tramadol Unknown - Low Severity     Past Medical History:   Diagnosis Date    Anxiety and depression     Celiac disease     Crohn's disease     GERD (gastroesophageal reflux disease)     IBS (irritable bowel syndrome)     PTSD (post-traumatic stress disorder)      Past Surgical History:   Procedure Laterality Date    APPENDECTOMY      CHOLECYSTECTOMY      HYSTERECTOMY      TRIGGER FINGER RELEASE Right      History reviewed. No pertinent family history.    Home Medications:  Prior to Admission medications    Medication Sig Start Date End Date Taking? Authorizing Provider   PARoxetine (Paxil) 40 MG tablet Take 1 tablet by mouth Daily. 5/5/25  Yes Shabbir Kinsey MD   promethazine (PHENERGAN) 25 MG tablet Take 1 tablet by mouth Every 6 (Six) Hours As Needed for Nausea or Vomiting. 5/5/25  Yes Shabbir Kinsey MD   azithromycin (Zithromax Z-David) 250 MG tablet Take 2 tablets by mouth on day 1, then 1 tablet daily on days 2-5 10/23/24   Erick Lorenzo DO   diazePAM (VALIUM) 5 MG tablet Take 1 tablet by mouth Every 8 (Eight) Hours As Needed for Anxiety. 5/5/25   Shabbir Kinsey MD   dicyclomine (BENTYL) 10 MG capsule  4/4/24   Peña Suazo MD   famotidine (Pepcid) 40 MG tablet     ProviderPeña MD   LORazepam (ATIVAN) 1 MG tablet Take 1 tablet by mouth Every 8 (Eight) Hours As Needed for  Anxiety. 3/19/25   Alber Freeman MD   omeprazole (priLOSEC) 20 MG capsule     Peña Suazo MD   ondansetron ODT (ZOFRAN-ODT) 8 MG disintegrating tablet Take 1 tablet by mouth Every 8 (Eight) Hours As Needed for Nausea. 4/9/25   Herlinda Catherine APRN   predniSONE (DELTASONE) 20 MG tablet Take 1 tablet twice a day with meals. 10/18/24   Alexsandra Stearns MD   promethazine (PHENERGAN) 25 MG suppository Insert 1 suppository into the rectum Every 6 (Six) Hours As Needed for Nausea or Vomiting. 4/9/25   Herlinda Catherine APRN   promethazine-dextromethorphan (PROMETHAZINE-DM) 6.25-15 MG/5ML syrup Take 1 teaspoon each 4 to 6 hours as needed for cough.  Be aware that the medication can make you drowsy. 10/18/24   Alexsandra Stearns MD   PARoxetine (Paxil) 40 MG tablet Take 1 tablet by mouth Daily. 3/19/25 5/5/25  Alber Freeman MD   promethazine (PHENERGAN) 25 MG tablet Take 1 tablet by mouth Every 6 (Six) Hours As Needed for Nausea or Vomiting. 3/19/25 5/5/25  Alber Freeman MD        Social History:   Social History     Tobacco Use    Smoking status: Former     Current packs/day: 0.50     Types: Cigarettes     Passive exposure: Past    Smokeless tobacco: Former   Vaping Use    Vaping status: Never Used   Substance Use Topics    Alcohol use: Not Currently    Drug use: Never         Review of Systems:  Review of Systems   Constitutional:  Negative for chills and fever.   HENT:  Negative for congestion, rhinorrhea and sore throat.    Eyes:  Negative for pain and visual disturbance.   Respiratory:  Negative for apnea, cough, chest tightness and shortness of breath.    Cardiovascular:  Negative for chest pain and palpitations.   Gastrointestinal:  Negative for abdominal pain, diarrhea, nausea and vomiting.   Genitourinary:  Negative for difficulty urinating and dysuria.   Musculoskeletal:  Negative for joint swelling and myalgias.   Skin:  Negative for color change.   Neurological:  Negative for seizures  "and headaches.   Psychiatric/Behavioral:  Negative for suicidal ideas. The patient is nervous/anxious.    All other systems reviewed and are negative.       Physical Exam:  /88 (BP Location: Right arm, Patient Position: Lying)   Pulse 98   Temp 98.5 °F (36.9 °C) (Oral)   Resp 22   Ht 152.4 cm (60\")   Wt 77.5 kg (170 lb 13.7 oz)   SpO2 98%   BMI 33.37 kg/m²     Physical Exam  Vitals and nursing note reviewed.   Constitutional:       General: She is not in acute distress.     Appearance: Normal appearance. She is not toxic-appearing.   HENT:      Head: Normocephalic and atraumatic.      Jaw: There is normal jaw occlusion.   Eyes:      General: Lids are normal.      Extraocular Movements: Extraocular movements intact.      Conjunctiva/sclera: Conjunctivae normal.      Pupils: Pupils are equal, round, and reactive to light.   Cardiovascular:      Rate and Rhythm: Normal rate and regular rhythm.      Pulses: Normal pulses.      Heart sounds: Normal heart sounds.   Pulmonary:      Effort: Pulmonary effort is normal. No respiratory distress.      Breath sounds: Normal breath sounds. No wheezing or rhonchi.   Abdominal:      General: Abdomen is flat.      Palpations: Abdomen is soft.      Tenderness: There is no abdominal tenderness. There is no guarding or rebound.   Musculoskeletal:         General: Normal range of motion.      Cervical back: Normal range of motion and neck supple.      Right lower leg: No edema.      Left lower leg: No edema.   Skin:     General: Skin is warm and dry.   Neurological:      Mental Status: She is alert and oriented to person, place, and time. Mental status is at baseline.   Psychiatric:      Comments: Severely anxious                    Medical Decision Making:      Comorbidities that affect care:    Anxiety    External Notes reviewed:    Previous Clinic Note: Urgent care visit for gastroenteritis management      The following orders were placed and all results were " independently analyzed by me:  No orders of the defined types were placed in this encounter.      Medications Given in the Emergency Department:  Medications   diazePAM (VALIUM) tablet 5 mg (5 mg Oral Given 5/5/25 1225)   promethazine (PHENERGAN) tablet 25 mg (25 mg Oral Given 5/5/25 1225)        ED Course:         Labs:    Lab Results (last 24 hours)       ** No results found for the last 24 hours. **             Imaging:    No Radiology Exams Resulted Within Past 24 Hours      Differential Diagnosis and Discussion:    Psychiatric: Differential diagnosis includes but is not limited to depression, psychosis, bipolar disorder, anxiety, manic episode, schizophrenia, and substance abuse.    PROCEDURES:        No orders to display       Procedures    MDM  Number of Diagnoses or Management Options  Acute stress reaction  Panic attack  Diagnosis management comments: In summary this is a 49-year-old female who presents to the [facility] for evaluation and treatment of severe anxiety and panic attack.  She was given oral Valium in the emergency department with improvement of symptoms.  Short-term Valium prescription provided.  Very strict return to ER and follow-up instructions have been provided to the patient.                         Patient Care Considerations:    LABS: I considered ordering labs, however no signs of metabolic derangement      Consultants/Shared Management Plan:    None    Social Determinants of Health:    Patient has presented with family members who are responsible, reliable and will ensure follow up care.      Disposition and Care Coordination:    Discharged: The patient is suitable and stable for discharge with no need for consideration of admission.    I have explained the patient´s condition, diagnoses and treatment plan based on the information available to me at this time. I have answered questions and addressed any concerns. The patient has a good  understanding of the patient´s  diagnosis, condition, and treatment plan as can be expected at this point. The vital signs have been stable. The patient´s condition is stable and appropriate for discharge from the emergency department.      The patient will pursue further outpatient evaluation with the primary care physician or other designated or consulting physician as outlined in the discharge instructions. They are agreeable to this plan of care and follow-up instructions have been explained in detail. The patient has received these instructions in written format and has expressed an understanding of the discharge instructions. The patient is aware that any significant change in condition or worsening of symptoms should prompt an immediate return to this or the closest emergency department or call to [facility].  I have explained discharge medications and the need for follow up with the patient/caretakers. This was also printed in the discharge instructions. Patient was discharged with the following medications and follow up:      Medication List        New Prescriptions      diazePAM 5 MG tablet  Commonly known as: VALIUM  Take 1 tablet by mouth Every 8 (Eight) Hours As Needed for Anxiety.               Where to Get Your Medications        These medications were sent to [facility] [address] [place] [address] [phone] [facility] [phone] [place] [address]      Phone: [phone]   diazePAM 5 MG tablet  PARoxetine 40 MG tablet  promethazine 25 MG tablet      Noah Razo MD  [address] [facility] [address]  [phone]    In 3 days         Final diagnoses:   Acute stress reaction   Panic attack        ED Disposition       ED Disposition   Discharge    Condition   Stable    Comment   --               This medical record created using voice recognition software.             Shabbir Kinsey MD  05/05/25 1234